# Patient Record
(demographics unavailable — no encounter records)

---

## 2025-07-15 NOTE — ASSESSMENT
[FreeTextEntry1] : 40-year-old female with a paronychia left index finger  Explained that her symptoms seem consistent with a paronychia that is resolved with time and the antibiotics that she was taken for the cyst on her face.  I believe she has irritation of the nerve and also she is having radiating pain proximally.  At this point I recommend using the hand normally and to finish her course of antibiotics.  If no improvement in the next 3 to 4 weeks return for repeat evaluation and we will consider gabapentin

## 2025-07-15 NOTE — PHYSICAL EXAM
[de-identified] : General: No acute distress Resp: Nonlabored Cards; [facility]     Left index finger Minor resolving redness along the paronychia him on the radial side of the digit.  There were no drainage and no collections at this time.  The nail plate is intact.  She make a full fist and extend all fingers.  She has no pain on palpation proximally but does have some pain on tapping over the site of the paronychia which radiates down the hand   [de-identified] : 3 views of the left index finger obtained reviewed in clinic showing no fracture dislocations no soft tissue abnormalities and no indication of a foreign body

## 2025-07-15 NOTE — HISTORY OF PRESENT ILLNESS
[Right] : right hand dominant [FreeTextEntry1] : Patient presents today with throbbing pain at the DIP joint of the left index finger which radiates down to the thumb.  She has been symptomatic since June 27 after a manicure.  Does note she has been on antibiotics for cyst on her face.  She denies any specific trauma.  Patient had x-rays which was negative for fractures. She has been taking meds at night for the pain. She has been wearing a finger splint occasionally.